# Patient Record
Sex: FEMALE | URBAN - METROPOLITAN AREA
[De-identification: names, ages, dates, MRNs, and addresses within clinical notes are randomized per-mention and may not be internally consistent; named-entity substitution may affect disease eponyms.]

---

## 2022-12-18 ENCOUNTER — INPATIENT (INPATIENT)
Facility: HOSPITAL | Age: 47
LOS: 8 days | Discharge: HOME | DRG: 885 | End: 2022-12-27
Attending: PSYCHIATRY & NEUROLOGY | Admitting: PSYCHIATRY & NEUROLOGY
Payer: COMMERCIAL

## 2022-12-18 VITALS
TEMPERATURE: 98 F | WEIGHT: 130.07 LBS | DIASTOLIC BLOOD PRESSURE: 98 MMHG | OXYGEN SATURATION: 99 % | SYSTOLIC BLOOD PRESSURE: 144 MMHG | HEIGHT: 65 IN | HEART RATE: 96 BPM | RESPIRATION RATE: 16 BRPM

## 2022-12-18 DIAGNOSIS — F10.10 ALCOHOL ABUSE, UNCOMPLICATED: ICD-10-CM

## 2022-12-18 DIAGNOSIS — F43.10 POST-TRAUMATIC STRESS DISORDER, UNSPECIFIED: ICD-10-CM

## 2022-12-18 DIAGNOSIS — F31.9 BIPOLAR DISORDER, UNSPECIFIED: ICD-10-CM

## 2022-12-18 LAB
ANION GAP SERPL CALC-SCNC: 8 MMOL/L — SIGNIFICANT CHANGE UP (ref 5–17)
APAP SERPL-MCNC: < 2 UG/ML (ref 10–30)
APPEARANCE UR: CLEAR — SIGNIFICANT CHANGE UP
BASOPHILS # BLD AUTO: 0.06 K/UL — SIGNIFICANT CHANGE UP (ref 0–0.2)
BASOPHILS NFR BLD AUTO: 0.7 % — SIGNIFICANT CHANGE UP (ref 0–2)
BILIRUB UR-MCNC: NEGATIVE — SIGNIFICANT CHANGE UP
BUN SERPL-MCNC: 12 MG/DL — SIGNIFICANT CHANGE UP (ref 7–23)
CALCIUM SERPL-MCNC: 8.1 MG/DL — LOW (ref 8.5–10.1)
CHLORIDE SERPL-SCNC: 111 MMOL/L — HIGH (ref 96–108)
CO2 SERPL-SCNC: 23 MMOL/L — SIGNIFICANT CHANGE UP (ref 22–31)
COLOR SPEC: YELLOW — SIGNIFICANT CHANGE UP
CREAT SERPL-MCNC: 0.61 MG/DL — SIGNIFICANT CHANGE UP (ref 0.5–1.3)
DIFF PNL FLD: NEGATIVE — SIGNIFICANT CHANGE UP
EGFR: 111 ML/MIN/1.73M2 — SIGNIFICANT CHANGE UP
EOSINOPHIL # BLD AUTO: 0.01 K/UL — SIGNIFICANT CHANGE UP (ref 0–0.5)
EOSINOPHIL NFR BLD AUTO: 0.1 % — SIGNIFICANT CHANGE UP (ref 0–6)
ETHANOL SERPL-MCNC: 139 MG/DL — HIGH (ref 0–10)
FLUAV AG NPH QL: SIGNIFICANT CHANGE UP
FLUBV AG NPH QL: SIGNIFICANT CHANGE UP
GLUCOSE SERPL-MCNC: 101 MG/DL — HIGH (ref 70–99)
GLUCOSE UR QL: NEGATIVE — SIGNIFICANT CHANGE UP
HCT VFR BLD CALC: 42.6 % — SIGNIFICANT CHANGE UP (ref 34.5–45)
HGB BLD-MCNC: 14.7 G/DL — SIGNIFICANT CHANGE UP (ref 11.5–15.5)
IMM GRANULOCYTES NFR BLD AUTO: 0.2 % — SIGNIFICANT CHANGE UP (ref 0–0.9)
KETONES UR-MCNC: NEGATIVE — SIGNIFICANT CHANGE UP
LEUKOCYTE ESTERASE UR-ACNC: NEGATIVE — SIGNIFICANT CHANGE UP
LYMPHOCYTES # BLD AUTO: 1.96 K/UL — SIGNIFICANT CHANGE UP (ref 1–3.3)
LYMPHOCYTES # BLD AUTO: 21.7 % — SIGNIFICANT CHANGE UP (ref 13–44)
MCHC RBC-ENTMCNC: 32 PG — SIGNIFICANT CHANGE UP (ref 27–34)
MCHC RBC-ENTMCNC: 34.5 GM/DL — SIGNIFICANT CHANGE UP (ref 32–36)
MCV RBC AUTO: 92.6 FL — SIGNIFICANT CHANGE UP (ref 80–100)
MONOCYTES # BLD AUTO: 0.45 K/UL — SIGNIFICANT CHANGE UP (ref 0–0.9)
MONOCYTES NFR BLD AUTO: 5 % — SIGNIFICANT CHANGE UP (ref 2–14)
NEUTROPHILS # BLD AUTO: 6.54 K/UL — SIGNIFICANT CHANGE UP (ref 1.8–7.4)
NEUTROPHILS NFR BLD AUTO: 72.3 % — SIGNIFICANT CHANGE UP (ref 43–77)
NITRITE UR-MCNC: NEGATIVE — SIGNIFICANT CHANGE UP
PCP SPEC-MCNC: SIGNIFICANT CHANGE UP
PH UR: 5 — SIGNIFICANT CHANGE UP (ref 5–8)
PLATELET # BLD AUTO: 296 K/UL — SIGNIFICANT CHANGE UP (ref 150–400)
POTASSIUM SERPL-MCNC: 3.8 MMOL/L — SIGNIFICANT CHANGE UP (ref 3.5–5.3)
POTASSIUM SERPL-SCNC: 3.8 MMOL/L — SIGNIFICANT CHANGE UP (ref 3.5–5.3)
PROT UR-MCNC: NEGATIVE — SIGNIFICANT CHANGE UP
RBC # BLD: 4.6 M/UL — SIGNIFICANT CHANGE UP (ref 3.8–5.2)
RBC # FLD: 12.3 % — SIGNIFICANT CHANGE UP (ref 10.3–14.5)
RSV RNA NPH QL NAA+NON-PROBE: SIGNIFICANT CHANGE UP
SALICYLATES SERPL-MCNC: 2.4 MG/DL — LOW (ref 2.8–20)
SARS-COV-2 RNA SPEC QL NAA+PROBE: SIGNIFICANT CHANGE UP
SODIUM SERPL-SCNC: 142 MMOL/L — SIGNIFICANT CHANGE UP (ref 135–145)
SP GR SPEC: 1.01 — SIGNIFICANT CHANGE UP (ref 1.01–1.02)
UROBILINOGEN FLD QL: NEGATIVE — SIGNIFICANT CHANGE UP
WBC # BLD: 9.04 K/UL — SIGNIFICANT CHANGE UP (ref 3.8–10.5)
WBC # FLD AUTO: 9.04 K/UL — SIGNIFICANT CHANGE UP (ref 3.8–10.5)

## 2022-12-18 PROCEDURE — 70498 CT ANGIOGRAPHY NECK: CPT | Mod: 26,MA

## 2022-12-18 PROCEDURE — 70496 CT ANGIOGRAPHY HEAD: CPT | Mod: 26,MA

## 2022-12-18 PROCEDURE — 99285 EMERGENCY DEPT VISIT HI MDM: CPT

## 2022-12-18 RX ORDER — DIPHENHYDRAMINE HCL 50 MG
50 CAPSULE ORAL ONCE
Refills: 0 | Status: DISCONTINUED | OUTPATIENT
Start: 2022-12-18 | End: 2022-12-18

## 2022-12-18 RX ORDER — ALPRAZOLAM 0.25 MG
1 TABLET ORAL DAILY
Refills: 0 | Status: DISCONTINUED | OUTPATIENT
Start: 2022-12-18 | End: 2022-12-20

## 2022-12-18 RX ORDER — ACETAMINOPHEN 500 MG
650 TABLET ORAL ONCE
Refills: 0 | Status: COMPLETED | OUTPATIENT
Start: 2022-12-18 | End: 2022-12-18

## 2022-12-18 RX ORDER — PRAZOSIN HCL 2 MG
1 CAPSULE ORAL AT BEDTIME
Refills: 0 | Status: DISCONTINUED | OUTPATIENT
Start: 2022-12-18 | End: 2022-12-27

## 2022-12-18 RX ORDER — DIPHENHYDRAMINE HCL 50 MG
50 CAPSULE ORAL ONCE
Refills: 0 | Status: DISCONTINUED | OUTPATIENT
Start: 2022-12-18 | End: 2022-12-27

## 2022-12-18 RX ORDER — DIPHENHYDRAMINE HCL 50 MG
50 CAPSULE ORAL EVERY 6 HOURS
Refills: 0 | Status: DISCONTINUED | OUTPATIENT
Start: 2022-12-18 | End: 2022-12-27

## 2022-12-18 RX ORDER — HALOPERIDOL DECANOATE 100 MG/ML
5 INJECTION INTRAMUSCULAR ONCE
Refills: 0 | Status: DISCONTINUED | OUTPATIENT
Start: 2022-12-18 | End: 2022-12-27

## 2022-12-18 RX ORDER — CITALOPRAM 10 MG/1
40 TABLET, FILM COATED ORAL AT BEDTIME
Refills: 0 | Status: DISCONTINUED | OUTPATIENT
Start: 2022-12-18 | End: 2022-12-20

## 2022-12-18 RX ORDER — HALOPERIDOL DECANOATE 100 MG/ML
5 INJECTION INTRAMUSCULAR EVERY 6 HOURS
Refills: 0 | Status: DISCONTINUED | OUTPATIENT
Start: 2022-12-18 | End: 2022-12-27

## 2022-12-18 RX ADMIN — Medication 650 MILLIGRAM(S): at 07:35

## 2022-12-18 RX ADMIN — CITALOPRAM 40 MILLIGRAM(S): 10 TABLET, FILM COATED ORAL at 22:51

## 2022-12-18 RX ADMIN — Medication 50 MILLIGRAM(S): at 13:22

## 2022-12-18 RX ADMIN — Medication 1 MILLIGRAM(S): at 22:51

## 2022-12-18 RX ADMIN — Medication 650 MILLIGRAM(S): at 08:00
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
## 2022-12-18 NOTE — ED ADULT NURSE NOTE - CHIEF COMPLAINT QUOTE
Pt presents to ED, [facility] [place] [facility]- badge #[id number]. As per [place] PD patient made suicidal statements, states "The son told me she told him she tried to hang herself earlier tonight." Pt currently denies SI/ HI. Endorses having 1 drink of alcohol today. Also c/o headache x weeks. Pmhx PTSD Patient calm and cooperative, placed on 1:1 upon arrival.
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
## 2022-12-18 NOTE — ED ADULT TRIAGE NOTE - CHIEF COMPLAINT QUOTE
Pt presents to ED, YAMIL [place] PD- badge #[id number]. As per [place] PD patient made suicidal statements, states "The son told me she told him she tried to hang herself earlier tonight." Pt currently denies SI/ HI. Endorses having 1 drink of alcohol today. Also c/o headache x weeks. Pmhx PTSD Pt presents to ED, YAMIL [place] PD- badge #[id number]. As per [place] PD patient made suicidal statements, states "The son told me she told him she tried to hang herself earlier tonight." Pt currently denies SI/ HI. Endorses having 1 drink of alcohol today. Also c/o headache x weeks. Pmhx PTSD Patient calm and cooperative, placed on 1:1 upon arrival.

## 2022-12-18 NOTE — ED BEHAVIORAL HEALTH ASSESSMENT NOTE - DESCRIPTION
None Vital Signs Last 24 Hrs  T(C): 36.7 (18 Dec 2022 09:11), Max: 36.7 (18 Dec 2022 03:21)  T(F): 98 (18 Dec 2022 09:11), Max: 98 (18 Dec 2022 03:21)  HR: 95 (18 Dec 2022 09:11) (95 - 96)  BP: 130/77 (18 Dec 2022 09:11) (130/77 - 144/98)  BP(mean): --  RR: 16 (18 Dec 2022 09:11) (16 - 16)  SpO2: 100% (18 Dec 2022 09:11) (99% - 100%)    Parameters below as of 18 Dec 2022 09:11  Patient On (Oxygen Delivery Method): room air See HPI

## 2022-12-18 NOTE — ED BEHAVIORAL HEALTH ASSESSMENT NOTE - NSBHATTESTAPPBILLTIME_PSY_A_CORE
I attest my time as JUANIS is greater than 50% of the total combined time spent on qualifying patient care activities. I have reviewed and verified the documentation.
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
## 2022-12-19 DIAGNOSIS — F31.9 BIPOLAR DISORDER, UNSPECIFIED: ICD-10-CM

## 2022-12-19 DIAGNOSIS — Z90.710 ACQUIRED ABSENCE OF BOTH CERVIX AND UTERUS: Chronic | ICD-10-CM

## 2022-12-19 PROCEDURE — 83036 HEMOGLOBIN GLYCOSYLATED A1C: CPT

## 2022-12-19 PROCEDURE — 99252 IP/OBS CONSLTJ NEW/EST SF 35: CPT

## 2022-12-19 PROCEDURE — 84443 ASSAY THYROID STIM HORMONE: CPT

## 2022-12-19 PROCEDURE — 84436 ASSAY OF TOTAL THYROXINE: CPT

## 2022-12-19 PROCEDURE — 80061 LIPID PANEL: CPT

## 2022-12-19 PROCEDURE — 36415 COLL VENOUS BLD VENIPUNCTURE: CPT

## 2022-12-19 PROCEDURE — 86376 MICROSOMAL ANTIBODY EACH: CPT

## 2022-12-19 RX ORDER — ACETAMINOPHEN 500 MG
650 TABLET ORAL ONCE
Refills: 0 | Status: COMPLETED | OUTPATIENT
Start: 2022-12-19 | End: 2022-12-19

## 2022-12-19 RX ADMIN — Medication 1 MILLIGRAM(S): at 08:18

## 2022-12-19 RX ADMIN — CITALOPRAM 40 MILLIGRAM(S): 10 TABLET, FILM COATED ORAL at 22:17

## 2022-12-19 RX ADMIN — Medication 2 MILLIGRAM(S): at 22:18

## 2022-12-19 RX ADMIN — Medication 650 MILLIGRAM(S): at 02:30

## 2022-12-19 RX ADMIN — Medication 1 MILLIGRAM(S): at 22:17

## 2022-12-19 RX ADMIN — Medication 650 MILLIGRAM(S): at 02:00

## 2022-12-19 NOTE — H&P ADULT - NS ATTEND AMEND GEN_ALL_CORE FT
Case discussed and reviewed in detail after initial evaluation above by ANABEL Hook. Please note my plan below.    47 y/o F w/ PMH of etoh abuse PTSD, bipolar disrder, p/w suicidal ideation    *PTSD / bipolar / suicidal ideation  -Management as per psych     *Heterogeneous 3.1 cm nodule in the left lobe of thyroid gland incidentally noted on CT  -F/u outpatient Endo + outpatient thyroid U/S  -TSH     *DVT ppx  -Encourage ambulation

## 2022-12-19 NOTE — ED ADULT NURSE REASSESSMENT NOTE - NS ED NURSE REASSESS COMMENT FT1
Assumed care of pt from previous RN. A/O x4. Respirations are even and unlabored on room air. Pt resting in bed comfortably with 1:1 aide at bedside. Environment checked and safety measures maintained. Pt offers no complaints of pain or discomfort at this time.
Pt calm and cooperative, continues to be on constant observation w/ staff at bedside at all times. Environment checked for safety. Denies pain/ symptoms at this time. Vital signs stable. Primary RN Casandra aware.
Pt resting comfortably in stretcher with eyes closed. Respirations are even and unlabored on room air. 1:1 aide at bedside. Environment checked and safety measures maintained.
pt calm and cooperative resting in bed. 1:1 maintained
pt calm and cooperative. 1:1 maintained
pt daughter Madai cell phone number: [phone]
Pt reported unable to sleep last night and asked for medication this morning for anxiety. Pt administered xanax as per PRN order. Pt noted to be sleeping after administration of xanax and comfortable appearing. MD Velasco made aware. 1:1 with clinical staff maintained at bedside.

## 2022-12-19 NOTE — ED BEHAVIORAL HEALTH NOTE - BEHAVIORAL HEALTH NOTE
Per RN patient remains in private room, gowned/wanded, and on 1:1 observation for consult. No SI/HI/AH/VH elicited; patient has remained resting/asleep. Remains in behavioral control. Patient received routine night medications; no PRN medications required. Patient unaccompanied by social supports. Patient pending psychiatric bed placement.

## 2022-12-19 NOTE — H&P ADULT - NSICDXPASTMEDICALHX_GEN_ALL_CORE_FT
PAST MEDICAL HISTORY:  Bipolar disorder      PAST MEDICAL HISTORY:  Bipolar disorder     Trevino syndrome

## 2022-12-19 NOTE — H&P ADULT - ASSESSMENT
47 y/o female with history of alcohol abuse, PTSD and Bipolar disorder admitted to inpatient psychiatry for suicidal ideation.     # Bipolar disorder   # PTSD  - Management per primary psych team    # ETOH abuse  No signs of withdrawal, CIWA is 0  - Monitor for withdrawal   - MVI/ Thiamine/ folic acid   - CIWA protocol   - Cessation counseling     DVT ppx  Patient is ambulatory, encourage ambulation     Will sign off, reconsult PRN    D/w Dr. Madden

## 2022-12-19 NOTE — ED ADULT NURSE REASSESSMENT NOTE - GENERAL PATIENT STATE
comfortable appearance/resting/sleeping
comfortable appearance/resting/sleeping
comfortable appearance/cooperative/improvement verbalized/resting/sleeping/smiling/interactive

## 2022-12-19 NOTE — H&P ADULT - NSHPPHYSICALEXAM_GEN_ALL_CORE
Vital Signs Last 24 Hrs  T(C): 36.9 (19 Dec 2022 15:54), Max: 36.9 (19 Dec 2022 07:53)  T(F): 98.5 (19 Dec 2022 15:54), Max: 98.5 (19 Dec 2022 15:08)  HR: 76 (19 Dec 2022 15:08) (69 - 83)  BP: 120/88 (19 Dec 2022 15:08) (114/84 - 127/61)    RR: 16 (19 Dec 2022 15:54) (15 - 16)  SpO2: 97% (19 Dec 2022 15:54) (96% - 100%)    Parameters below as of 19 Dec 2022 15:08  Patient On (Oxygen Delivery Method): room air

## 2022-12-20 LAB
A1C WITH ESTIMATED AVERAGE GLUCOSE RESULT: 5.5 % — SIGNIFICANT CHANGE UP (ref 4–5.6)
CHOLEST SERPL-MCNC: 196 MG/DL — SIGNIFICANT CHANGE UP
ESTIMATED AVERAGE GLUCOSE: 111 MG/DL — SIGNIFICANT CHANGE UP (ref 68–114)
HDLC SERPL-MCNC: 67 MG/DL — SIGNIFICANT CHANGE UP
LIPID PNL WITH DIRECT LDL SERPL: 96 MG/DL — SIGNIFICANT CHANGE UP
NON HDL CHOLESTEROL: 129 MG/DL — SIGNIFICANT CHANGE UP
TRIGL SERPL-MCNC: 169 MG/DL — HIGH
TSH SERPL-MCNC: 1.53 UU/ML — SIGNIFICANT CHANGE UP (ref 0.34–4.82)

## 2022-12-20 PROCEDURE — 99223 1ST HOSP IP/OBS HIGH 75: CPT

## 2022-12-20 RX ORDER — CITALOPRAM 10 MG/1
10 TABLET, FILM COATED ORAL DAILY
Refills: 0 | Status: DISCONTINUED | OUTPATIENT
Start: 2022-12-20 | End: 2022-12-21

## 2022-12-20 RX ORDER — RISPERIDONE 4 MG/1
1 TABLET ORAL EVERY 12 HOURS
Refills: 0 | Status: DISCONTINUED | OUTPATIENT
Start: 2022-12-20 | End: 2022-12-21

## 2022-12-20 RX ORDER — FOLIC ACID 0.8 MG
1 TABLET ORAL DAILY
Refills: 0 | Status: DISCONTINUED | OUTPATIENT
Start: 2022-12-20 | End: 2022-12-27

## 2022-12-20 RX ORDER — THIAMINE MONONITRATE (VIT B1) 100 MG
100 TABLET ORAL DAILY
Refills: 0 | Status: DISCONTINUED | OUTPATIENT
Start: 2022-12-20 | End: 2022-12-27

## 2022-12-20 RX ADMIN — Medication 1 MILLIGRAM(S): at 09:30

## 2022-12-20 RX ADMIN — Medication 1 MILLIGRAM(S): at 21:01

## 2022-12-20 RX ADMIN — CITALOPRAM 10 MILLIGRAM(S): 10 TABLET, FILM COATED ORAL at 21:01

## 2022-12-20 RX ADMIN — Medication 1 TABLET(S): at 09:30

## 2022-12-20 RX ADMIN — RISPERIDONE 1 MILLIGRAM(S): 4 TABLET ORAL at 21:01

## 2022-12-20 RX ADMIN — Medication 2 MILLIGRAM(S): at 21:16

## 2022-12-20 RX ADMIN — Medication 100 MILLIGRAM(S): at 09:30

## 2022-12-20 NOTE — BH INPATIENT PSYCHIATRY ASSESSMENT NOTE - NSBHCRANIAL_PSY_ALL_CORE
Recognizes 2 fingers or can read (II)/Smiles, shows teeth, opens mouth, sticks out tongue (V, VII, XI)/Normal speech (IX, X, XII)/Shoulder shrug (XI)

## 2022-12-20 NOTE — BH INPATIENT PSYCHIATRY ASSESSMENT NOTE - NSBHMETABOLIC_PSY_ALL_CORE_FT
BMI: BMI (kg/m2): 21.6 (12-18-22 @ 03:21)  HbA1c: A1C with Estimated Average Glucose Result: 5.5 % (12-20-22 @ 07:14)    Glucose:   BP: 120/88 (12-19-22 @ 15:08) (114/84 - 144/98)  Lipid Panel: Date/Time: 12-20-22 @ 07:14  Cholesterol, Serum: 196  Direct LDL: --  HDL Cholesterol, Serum: 67  Total Cholesterol/HDL Ration Measurement: --  Triglycerides, Serum: 169

## 2022-12-20 NOTE — PSYCHIATRIC REHAB INITIAL EVALUATION - PRIMARY SOURCE OF SUPPORT/COMFORT
DTC Cardiac Surgery Progress Note     Recommendations/ Comments:   Pt arrived to CVICU at 2028 on 2-9-17. Consider continuing insulin gtt for at least 48hrs post-op and eating 50% solid foods then,  1) transition off gtt per Glucostabilizer Protocol   2) continue accu-checks and humalog correctional insulin ac & hs until pt has 3 consecutive BG <150mg/dl off gtt then discontinue. Insulin gtt should not be stopped until after 2028 on 2-11-17 to complete 48hr post-op time frame. Pt has required 8.8 units over the last 6 hours. Chart reviewed on Nithya Odell. Patient is 61 y.o. male s/p Cardiac Surgery. POD 1. Pt with no history of diabetes. A1c:   Lab Results   Component Value Date/Time    Hemoglobin A1c 5.2 02/09/2017 08:34 PM         Recent Glucose Results:   Lab Results   Component Value Date/Time     (H) 02/10/2017 03:48 AM     (H) 02/09/2017 08:34 PM     (H) 02/09/2017 12:49 PM    GLUCPOC 115 (H) 02/10/2017 09:05 AM    GLUCPOC 120 (H) 02/10/2017 08:01 AM    GLUCPOC 119 (H) 02/10/2017 06:55 AM        Lab Results   Component Value Date/Time    Creatinine 1.45 02/10/2017 03:48 AM       Active Orders   Diet    DIET NPO        PO intake: No data found. Currently on insulin gtt. Current drip rate is 1.7 units per hour and blood sugar at 0906 was 115 mg/dl. Will continue to follow as needed. Thank you.   Ivanna Martin RD, CDE
child(grace)
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
## 2022-12-20 NOTE — BH INPATIENT PSYCHIATRY ASSESSMENT NOTE - NSBHCHARTREVIEWVS_PSY_A_CORE FT
Vital Signs Last 24 Hrs  T(C): 36.7 (12-20-22 @ 07:24), Max: 36.9 (12-19-22 @ 15:54)  T(F): 98 (12-20-22 @ 07:24), Max: 98.5 (12-19-22 @ 15:54)  HR: --  BP: --  BP(mean): --  RR: 16 (12-20-22 @ 07:24) (16 - 16)  SpO2: 100% (12-20-22 @ 07:24) (97% - 100%)    Orthostatic VS  12-20-22 @ 07:24  Lying BP: --/-- HR: --  Sitting BP: 117/81 HR: 87  Standing BP: 114/76 HR: 100  Site: upper right arm  Mode: electronic  Orthostatic VS  12-19-22 @ 15:54  Lying BP: --/-- HR: --  Sitting BP: 130/77 HR: 87  Standing BP: 113/68 HR: 90  Site: --  Mode: --   Vital Signs Last 24 Hrs  T(C): 36.7 (12-20-22 @ 07:24), Max: 36.7 (12-20-22 @ 07:24)  T(F): 98 (12-20-22 @ 07:24), Max: 98 (12-20-22 @ 07:24)  HR: --  BP: --  BP(mean): --  RR: 16 (12-20-22 @ 07:24) (16 - 16)  SpO2: 100% (12-20-22 @ 07:24) (100% - 100%)    Orthostatic VS  12-20-22 @ 07:24  Lying BP: --/-- HR: --  Sitting BP: 117/81 HR: 87  Standing BP: 114/76 HR: 100  Site: upper right arm  Mode: electronic  Orthostatic VS  12-19-22 @ 15:54  Lying BP: --/-- HR: --  Sitting BP: 130/77 HR: 87  Standing BP: 113/68 HR: 90  Site: --  Mode: --

## 2022-12-20 NOTE — BH INPATIENT PSYCHIATRY ASSESSMENT NOTE - NSTXMANICGOAL_PSY_ALL_CORE
Be able to identify the early signs of solis (e.g. sleep and mood changes) and to employ coping strategies to minimize acting out

## 2022-12-20 NOTE — BH INPATIENT PSYCHIATRY ASSESSMENT NOTE - CURRENT MEDICATION
MEDICATIONS  (STANDING):  citalopram 10 milliGRAM(s) Oral daily  folic acid 1 milliGRAM(s) Oral daily  multivitamin 1 Tablet(s) Oral daily  prazosin. 1 milliGRAM(s) Oral at bedtime  risperiDONE   Tablet 1 milliGRAM(s) Oral every 12 hours  thiamine 100 milliGRAM(s) Oral daily    MEDICATIONS  (PRN):  diphenhydrAMINE 50 milliGRAM(s) Oral every 6 hours PRN Extrapyramidal prophylaxis  diphenhydrAMINE Injectable 50 milliGRAM(s) IntraMuscular once PRN Extrapyramidal prophylaxis  haloperidol     Tablet 5 milliGRAM(s) Oral every 6 hours PRN moderate psychotic agitation  haloperidol    Injectable 5 milliGRAM(s) IntraMuscular Once PRN severe psychotic agitation  LORazepam     Tablet 2 milliGRAM(s) Oral every 6 hours PRN moderate psychotic agitation  LORazepam   Injectable 2 milliGRAM(s) IntraMuscular Once PRN severe psychotic agitation
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
## 2022-12-21 LAB
T4 AB SER-ACNC: 5.7 UG/DL — SIGNIFICANT CHANGE UP (ref 4.6–12)
THYROPEROXIDASE AB SERPL-ACNC: <10 IU/ML — SIGNIFICANT CHANGE UP

## 2022-12-21 PROCEDURE — 99232 SBSQ HOSP IP/OBS MODERATE 35: CPT

## 2022-12-21 RX ORDER — ARIPIPRAZOLE 15 MG/1
5 TABLET ORAL AT BEDTIME
Refills: 0 | Status: COMPLETED | OUTPATIENT
Start: 2022-12-21 | End: 2022-12-21

## 2022-12-21 RX ORDER — SENNA PLUS 8.6 MG/1
2 TABLET ORAL AT BEDTIME
Refills: 0 | Status: DISCONTINUED | OUTPATIENT
Start: 2022-12-21 | End: 2022-12-27

## 2022-12-21 RX ORDER — CITALOPRAM 10 MG/1
10 TABLET, FILM COATED ORAL AT BEDTIME
Refills: 0 | Status: DISCONTINUED | OUTPATIENT
Start: 2022-12-21 | End: 2022-12-27

## 2022-12-21 RX ADMIN — CITALOPRAM 10 MILLIGRAM(S): 10 TABLET, FILM COATED ORAL at 20:49

## 2022-12-21 RX ADMIN — ARIPIPRAZOLE 5 MILLIGRAM(S): 15 TABLET ORAL at 20:49

## 2022-12-21 RX ADMIN — Medication 1 MILLIGRAM(S): at 09:47

## 2022-12-21 RX ADMIN — Medication 1 MILLIGRAM(S): at 20:49

## 2022-12-21 RX ADMIN — Medication 1 TABLET(S): at 09:47

## 2022-12-21 RX ADMIN — Medication 100 MILLIGRAM(S): at 09:47
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
## 2022-12-21 NOTE — BH SOCIAL WORK INITIAL PSYCHOSOCIAL EVALUATION - NSBHSAALC_PSY_A_CORE FT
Per chart, pt. endorses social use vs. family reports current and past ETOH dependence with treatment 1yo and current relapse.

## 2022-12-21 NOTE — BH INPATIENT PSYCHIATRY PROGRESS NOTE - NSBHMETABOLIC_PSY_ALL_CORE_FT
BMI: BMI (kg/m2): 21.6 (12-18-22 @ 03:21)  HbA1c: A1C with Estimated Average Glucose Result: 5.7 % (12-20-22 @ 07:14)  TSH-1.53    Glucose:   BP: 120/88 (12-19-22 @ 15:08) (114/84 - 127/61)  Lipid Panel: Date/Time: 12-20-22 @ 07:14  Cholesterol, Serum: 196  Direct LDL: 96  HDL Cholesterol, Serum: 67  Triglycerides, Serum: 169
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
## 2022-12-21 NOTE — BH SOCIAL WORK INITIAL PSYCHOSOCIAL EVALUATION - NSPROGENSOURCEINFO_PSY_ALL_CORE
Other(s) Attempted to meet w/ pt. but unavailable for interview. Assessment deferred to chart review and tx. team at this time./Other(s)
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
## 2022-12-21 NOTE — BH SOCIAL WORK INITIAL PSYCHOSOCIAL EVALUATION - NSBHLIFEGOAL_PSY_ALL_CORE
Assessment deferred with pt. at this time. Per chart, pt. looking for divorce from  and to establish own work and living arrangement.

## 2022-12-21 NOTE — BH SOCIAL WORK INITIAL PSYCHOSOCIAL EVALUATION - NSBHSUPPORTOTHER_PSY_ALL_CORE
No Pt. dtr. Madai Starks [phone] and rafal  [place] [phone] provided collateral for  matthew./No
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
## 2022-12-21 NOTE — BH SOCIAL WORK INITIAL PSYCHOSOCIAL EVALUATION - NSBHLEGALOTHER_PSY_ALL_CORE
Per chart, pt. reports in the process of getting divorce; however, pt.  and dtr. both report that pt. has not been getting divorce./Divorce

## 2022-12-21 NOTE — BH SOCIAL WORK INITIAL PSYCHOSOCIAL EVALUATION - NSBHSUBSTANCEHX_PSY_ALL_CORE
Per chart, pt. BAL was 139 on arrival. U-Tox+ for benzodiazepine's (prescribed) and THC. Pt. collateral describe current and past ETOH dependence/treatment.    Per chart, pt. describes ETOH use as social use only./Yes...

## 2022-12-21 NOTE — BH SOCIAL WORK INITIAL PSYCHOSOCIAL EVALUATION - NSBHFINANCE_PSY_ALL_CORE
Assessment deferred at this time. Per chart, pt.  to  who is employed and provides financial support./Unable to answer (specify)

## 2022-12-21 NOTE — BH SOCIAL WORK INITIAL PSYCHOSOCIAL EVALUATION - NSBHCHILDRESP_PSY_ALL_CORE
Pt.  to  lilian/ [place] [phone] but in the process of , 4 adult children 29, 24, 22 and 19    pt daughter Madai cell phone number: [phone]/No Pt.  to  lilian/ [place] [phone] but in the process of  (family refute), 4 adult children 29, 24, 22 and 19    pt daughter Madai [place] phone number: [phone]/No

## 2022-12-22 PROCEDURE — 99232 SBSQ HOSP IP/OBS MODERATE 35: CPT

## 2022-12-22 RX ORDER — ARIPIPRAZOLE 15 MG/1
10 TABLET ORAL ONCE
Refills: 0 | Status: COMPLETED | OUTPATIENT
Start: 2022-12-22 | End: 2022-12-22

## 2022-12-22 RX ORDER — ACETAMINOPHEN 500 MG
650 TABLET ORAL EVERY 6 HOURS
Refills: 0 | Status: DISCONTINUED | OUTPATIENT
Start: 2022-12-22 | End: 2022-12-27

## 2022-12-22 RX ORDER — ARIPIPRAZOLE 15 MG/1
15 TABLET ORAL AT BEDTIME
Refills: 0 | Status: DISCONTINUED | OUTPATIENT
Start: 2022-12-23 | End: 2022-12-27

## 2022-12-22 RX ADMIN — ARIPIPRAZOLE 10 MILLIGRAM(S): 15 TABLET ORAL at 20:16

## 2022-12-22 RX ADMIN — CITALOPRAM 10 MILLIGRAM(S): 10 TABLET, FILM COATED ORAL at 20:16

## 2022-12-22 RX ADMIN — Medication 1 MILLIGRAM(S): at 09:05

## 2022-12-22 RX ADMIN — Medication 1 MILLIGRAM(S): at 20:17

## 2022-12-22 RX ADMIN — Medication 650 MILLIGRAM(S): at 20:59

## 2022-12-22 RX ADMIN — Medication 100 MILLIGRAM(S): at 09:05

## 2022-12-22 RX ADMIN — Medication 1 TABLET(S): at 09:06

## 2022-12-22 RX ADMIN — Medication 650 MILLIGRAM(S): at 21:29
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
## 2022-12-22 NOTE — BH INPATIENT PSYCHIATRY PROGRESS NOTE - NSBHMETABOLIC_PSY_ALL_CORE_FT
BMI: BMI (kg/m2): 21.6 (12-18-22 @ 03:21)  HbA1c: A1C with Estimated Average Glucose Result: 5.5 % (12-20-22 @ 07:14)  TSH-5.5    Glucose:   BP: 120/88 (12-19-22 @ 15:08) (120/88 - 120/88)  Lipid Panel: Date/Time: 12-20-22 @ 07:14  Cholesterol, Serum: 196  Direct LDL: 94  HDL Cholesterol, Serum: 67  Triglycerides, Serum: 169

## 2022-12-22 NOTE — BH TREATMENT PLAN - NSTXMANICINTERRN_PSY_ALL_CORE
Patient will be able to identify the early signs of solis (e.g sleep and mood changes ) and to employ coping strategies to minimize acting out.

## 2022-12-22 NOTE — BH TREATMENT PLAN - NSTXPLANTHERAPYSESSIONSFT_PSY_ALL_CORE
12-20-22  Type of therapy: N/A  Type of session: N/A  Level of patient participation: Resistance to participation  Duration of participation: 0  Therapy conducted by: Psych rehab  Therapy Summary: Patient declined to attend group programming although encouraged.

## 2022-12-22 NOTE — BH TREATMENT PLAN - NSTXMANICINTERMD_PSY_ALL_CORE
Started her Abilify 5 mg daily and to continue Celexa 10 mg HS. Prefers meds at HS  Abilify now 10 mg HS

## 2022-12-23 PROCEDURE — 99232 SBSQ HOSP IP/OBS MODERATE 35: CPT

## 2022-12-23 RX ORDER — ALPRAZOLAM 0.25 MG
1 TABLET ORAL AT BEDTIME
Refills: 0 | Status: DISCONTINUED | OUTPATIENT
Start: 2022-12-23 | End: 2022-12-27

## 2022-12-23 RX ADMIN — Medication 1 TABLET(S): at 09:12

## 2022-12-23 RX ADMIN — Medication 1 MILLIGRAM(S): at 20:41

## 2022-12-23 RX ADMIN — Medication 1 MILLIGRAM(S): at 09:12

## 2022-12-23 RX ADMIN — SENNA PLUS 2 TABLET(S): 8.6 TABLET ORAL at 20:41

## 2022-12-23 RX ADMIN — ARIPIPRAZOLE 15 MILLIGRAM(S): 15 TABLET ORAL at 20:41

## 2022-12-23 RX ADMIN — Medication 100 MILLIGRAM(S): at 09:12

## 2022-12-23 RX ADMIN — CITALOPRAM 10 MILLIGRAM(S): 10 TABLET, FILM COATED ORAL at 20:41

## 2022-12-23 NOTE — BH INPATIENT PSYCHIATRY PROGRESS NOTE - NSBHADMITMEDEDUDETAILS_PSY_A_CORE FT
Discussed meds s/e, risks, benefits of Celexa/Abilify

## 2022-12-23 NOTE — BH INPATIENT PSYCHIATRY PROGRESS NOTE - NSBHCHARTREVIEWLAB_PSY_A_CORE FT
CBC-WBC-9.04          RBC-4.60          HB-14.7          HCT-42.6    CMP-Na-142         K-3.8         Chloride-111         CO2-23         BUN-12         S.Crea-0.61
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
## 2022-12-23 NOTE — BH INPATIENT PSYCHIATRY PROGRESS NOTE - NSBHMETABOLIC_PSY_ALL_CORE_FT
BMI: BMI (kg/m2): 21.6 (12-18-22 @ 03:21)  HbA1c: A1C with Estimated Average Glucose Result: 5.5 % (12-20-22 @ 07:14)    Glucose:   BP: --  Lipid Panel: Date/Time: 12-20-22 @ 07:14  Cholesterol, Serum: 196  Direct LDL: --  HDL Cholesterol, Serum: 67  Total Cholesterol/HDL Ration Measurement: --  Triglycerides, Serum: 169

## 2022-12-23 NOTE — BH INPATIENT PSYCHIATRY PROGRESS NOTE - NSCGISEVERILLNESS_PSY_ALL_CORE
5 = Markedly ill - intrusive symptoms that distinctly impair social/occupational function or cause intrusive levels of distress
3 = Mildly ill – clearly established symptoms with minimal, if any, distress or difficulty in social and occupational function
5 = Markedly ill - intrusive symptoms that distinctly impair social/occupational function or cause intrusive levels of distress

## 2022-12-23 NOTE — BH INPATIENT PSYCHIATRY PROGRESS NOTE - NSBHCHARTREVIEWINVESTIGATE_PSY_A_CORE FT
< from: 12 Lead ECG (12.18.22 @ 05:05) >    Ventricular Rate 97 BPM    Atrial Rate 97 BPM    P-R Interval 138 ms    QRS Duration 76 ms    Q-T Interval 356 ms    QTC Calculation(Bazett) 452 ms    P Axis 69 degrees    R Axis 61 degrees    T Axis 65 degrees    Diagnosis Line Sinus rhythm with Premature atrial complexes  Cannot rule out Anterior infarct (cited on or before 18-DEC-2022)  Abnormal ECG  Confirmed by SHANNA BRADFORD MD ([id number]) on 12/18/2022 8:59:06 AM        

## 2022-12-24 PROCEDURE — 99233 SBSQ HOSP IP/OBS HIGH 50: CPT

## 2022-12-24 RX ADMIN — CITALOPRAM 10 MILLIGRAM(S): 10 TABLET, FILM COATED ORAL at 20:13

## 2022-12-24 RX ADMIN — Medication 1 MILLIGRAM(S): at 09:35

## 2022-12-24 RX ADMIN — Medication 100 MILLIGRAM(S): at 09:35

## 2022-12-24 RX ADMIN — ARIPIPRAZOLE 15 MILLIGRAM(S): 15 TABLET ORAL at 20:12

## 2022-12-24 RX ADMIN — Medication 1 MILLIGRAM(S): at 20:36

## 2022-12-24 RX ADMIN — Medication 1 TABLET(S): at 09:35

## 2022-12-24 RX ADMIN — Medication 1 MILLIGRAM(S): at 20:12

## 2022-12-24 NOTE — BH INPATIENT PSYCHIATRY PROGRESS NOTE - NSBHASSESSSUMMFT_PSY_ALL_CORE
46 year old female, , bipolar, PTSD ,EtOH use, BAL was 139 BIB after daughter called [facility] with concerns that pt was a danger to self.

## 2022-12-25 RX ADMIN — ARIPIPRAZOLE 15 MILLIGRAM(S): 15 TABLET ORAL at 20:32

## 2022-12-25 RX ADMIN — Medication 100 MILLIGRAM(S): at 08:49

## 2022-12-25 RX ADMIN — SENNA PLUS 2 TABLET(S): 8.6 TABLET ORAL at 20:31

## 2022-12-25 RX ADMIN — CITALOPRAM 10 MILLIGRAM(S): 10 TABLET, FILM COATED ORAL at 20:31

## 2022-12-25 RX ADMIN — Medication 1 MILLIGRAM(S): at 20:32

## 2022-12-25 RX ADMIN — Medication 1 MILLIGRAM(S): at 22:00

## 2022-12-25 RX ADMIN — Medication 1 MILLIGRAM(S): at 08:50

## 2022-12-25 RX ADMIN — Medication 1 TABLET(S): at 08:50

## 2022-12-25 RX ADMIN — Medication 30 MILLILITER(S): at 11:10

## 2022-12-26 RX ORDER — CITALOPRAM 10 MG/1
1 TABLET, FILM COATED ORAL
Qty: 30 | Refills: 0
Start: 2022-12-26 | End: 2023-01-24

## 2022-12-26 RX ORDER — PRAZOSIN HCL 2 MG
1 CAPSULE ORAL
Qty: 30 | Refills: 0
Start: 2022-12-26 | End: 2023-01-24

## 2022-12-26 RX ORDER — ALPRAZOLAM 0.25 MG
1 TABLET ORAL
Qty: 14 | Refills: 0
Start: 2022-12-26 | End: 2023-01-08

## 2022-12-26 RX ORDER — ARIPIPRAZOLE 15 MG/1
1 TABLET ORAL
Qty: 30 | Refills: 0
Start: 2022-12-26 | End: 2023-01-24

## 2022-12-26 RX ORDER — SENNA PLUS 8.6 MG/1
2 TABLET ORAL
Qty: 28 | Refills: 0
Start: 2022-12-26 | End: 2023-01-08

## 2022-12-26 RX ADMIN — Medication 1 MILLIGRAM(S): at 09:01

## 2022-12-26 RX ADMIN — CITALOPRAM 10 MILLIGRAM(S): 10 TABLET, FILM COATED ORAL at 21:30

## 2022-12-26 RX ADMIN — Medication 1 TABLET(S): at 09:00

## 2022-12-26 RX ADMIN — Medication 100 MILLIGRAM(S): at 09:01

## 2022-12-26 RX ADMIN — SENNA PLUS 2 TABLET(S): 8.6 TABLET ORAL at 21:30

## 2022-12-26 RX ADMIN — Medication 1 MILLIGRAM(S): at 21:30

## 2022-12-26 RX ADMIN — ARIPIPRAZOLE 15 MILLIGRAM(S): 15 TABLET ORAL at 21:30

## 2022-12-26 RX ADMIN — Medication 50 MILLIGRAM(S): at 21:30
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
## 2022-12-26 NOTE — BH INPATIENT PSYCHIATRY DISCHARGE NOTE - NSDCCPCAREPLAN_GEN_ALL_CORE_FT
PRINCIPAL DISCHARGE DIAGNOSIS  Diagnosis: Bipolar 1 disorder  Assessment and Plan of Treatment: Abilify 15 mg HS; Celexa 10 mg HS; Xanax 1 mg HS

## 2022-12-26 NOTE — BH INPATIENT PSYCHIATRY DISCHARGE NOTE - NSDCMRMEDTOKEN_GEN_ALL_CORE_FT
ALPRAZolam 1 mg oral tablet: 1 tab(s) orally once a day (at bedtime) x 14 days MDD:1 mg/day  ARIPiprazole 15 mg oral tablet: 1 tab(s) orally once a day (at bedtime) x 30 days   citalopram 10 mg oral tablet: 1 tab(s) orally once a day (at bedtime) x 30 days   prazosin 1 mg oral capsule: 1 cap(s) orally once a day (at bedtime) x 30 days   senna leaf extract oral tablet: 2 tab(s) orally once a day (at bedtime) x 14 days
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
## 2022-12-26 NOTE — BH INPATIENT PSYCHIATRY DISCHARGE NOTE - NSBHMETABOLIC_PSY_ALL_CORE_FT
BMI: BMI (kg/m2): 21.6 (12-18-22 @ 03:21)  HbA1c: A1C with Estimated Average Glucose Result: 5.5 % (12-20-22 @ 07:14)  TSH-1.53    Glucose:   BP: --  Lipid Panel: Date/Time: 12-20-22 @ 07:14  Cholesterol, Serum: 196  Direct LDL: 96  HDL Cholesterol, Serum: 67  Triglycerides, Serum: 169

## 2022-12-27 VITALS — OXYGEN SATURATION: 100 % | TEMPERATURE: 98 F | RESPIRATION RATE: 18 BRPM

## 2022-12-27 PROCEDURE — 99232 SBSQ HOSP IP/OBS MODERATE 35: CPT

## 2022-12-27 RX ADMIN — Medication 100 MILLIGRAM(S): at 09:32

## 2022-12-27 RX ADMIN — Medication 1 TABLET(S): at 09:31

## 2022-12-27 RX ADMIN — Medication 1 MILLIGRAM(S): at 09:31

## 2022-12-27 NOTE — BH INPATIENT PSYCHIATRY PROGRESS NOTE - NSBHASSESSSUMMFT_PSY_ALL_CORE
46 year old female, , bipolar, PTSD ,EtOH use, BAL was 139 BIB after daughter called [facility] with concerns that pt was a danger to self.   Covering MD Note (12/27/2022)  Pt. seen prior to her DC home from hospital on 12/27/2022. The pt was neatly attired and alert and oriented x 3. The pt was chatting away with staff and later with this writer about the pt's h/o mood swings and anxiety, both of which she described as significantly improved on her current med regimen of Abilify 15 mg po qhs, Celexa 10 mg and Xanax 1 mg all q hs per pt preference. The pt has continued to deny any current active SI /HI /AH /VH and she is looking forward to plans for herself possibly to train as a  " because I realized how much I like and miss my dogs since I have been away in the hospital."  Plan    1.Pt for DC home 12/27/2022   2.Pt has follow up appointment with DIONISIO Llamas in [facility] on 1/3/23 at 3;40 pm   3.Safety planning reviewed 46 year old female, , bipolar, PTSD ,EtOH use, BAL was 139 BIB after daughter called [facility] with concerns that pt was a danger to self.   Covering MD Note (12/27/2022)  Pt. seen prior to her DC home from hospital on 12/27/2022. The pt was neatly attired and alert and oriented x 3. The pt was chatting away with staff and later with this writer about the pt's h/o mood swings and anxiety, both of which she described as significantly improved on her current med regimen of Abilify 15 mg po qhs, Celexa 10 mg and Xanax 1 mg all q hs per pt preference. The pt has continued to deny any current active SI /HI /AH /VH and she is looking forward to plans for herself possibly to train as a  " because I realized how much I like and miss my dogs since I have been away in the hospital."  Plan    1.Pt for DC home 12/27/2022   2.Pt has follow up appointment with DIONISIO Llamas in [facility] on 1/3/23 at 3;40 pm   3.Safety planning reviewed

## 2022-12-27 NOTE — BH INPATIENT PSYCHIATRY PROGRESS NOTE - NSICDXBHPRIMARYDX_PSY_ALL_CORE
Bipolar 1 disorder   F31.9  

## 2022-12-27 NOTE — BH INPATIENT PSYCHIATRY PROGRESS NOTE - NSBHATTESTBILLINGAW_PSY_A_CORE
[id number] Inpatient care - moderate complexity - 25 minutes
[id number]-[facility] Inpatient care - moderate complexity - 25 minutes
[id number] Inpatient care - moderate complexity - 25 minutes
[id number] Inpatient care - moderate complexity - 25 minutes
Billing in another system

## 2022-12-27 NOTE — BH INPATIENT PSYCHIATRY PROGRESS NOTE - NSBHCHARTREVIEWVS_PSY_A_CORE FT
Vital Signs Last 24 Hrs  T(C): 36.6 (12-27-22 @ 06:41), Max: 36.6 (12-27-22 @ 06:41)  T(F): 97.9 (12-27-22 @ 06:41), Max: 97.9 (12-27-22 @ 06:41)  HR: --  BP: --  BP(mean): --  RR: 18 (12-27-22 @ 06:41) (18 - 18)  SpO2: 100% (12-27-22 @ 06:41) (100% - 100%)    Orthostatic VS  12-27-22 @ 06:41  Lying BP: --/-- HR: --  Sitting BP: 123/83 HR: 87  Standing BP: 124/82 HR: 90  Site: upper right arm  Mode: electronic  Orthostatic VS  12-26-22 @ 07:20  Lying BP: --/-- HR: --  Sitting BP: 115/74 HR: 79  Standing BP: 113/81 HR: 80  Site: upper right arm  Mode: electronic  
Vital Signs Last 24 Hrs  T(C): 36.7 (12-23-22 @ 08:02), Max: 36.7 (12-23-22 @ 08:02)  T(F): 98 (12-23-22 @ 08:02), Max: 98 (12-23-22 @ 08:02)  HR: --  BP: --  BP(mean): --  RR: 16 (12-23-22 @ 08:02) (16 - 16)  SpO2: 98% (12-23-22 @ 08:02) (98% - 98%)    Orthostatic VS  12-23-22 @ 08:02  Lying BP: --/-- HR: --  Sitting BP: 123/73 HR: 92  Standing BP: 112/75 HR: 88  Site: upper right arm  Mode: electronic  Orthostatic VS  12-22-22 @ 07:57  Lying BP: --/-- HR: --  Sitting BP: 116/80 HR: 88  Standing BP: 111/71 HR: 90  Site: upper right arm  Mode: electronic  
Vital Signs Last 24 Hrs  T(C): 36.6 (12-25-22 @ 07:47), Max: 36.6 (12-25-22 @ 07:47)  T(F): 97.8 (12-25-22 @ 07:47), Max: 97.8 (12-25-22 @ 07:47)  HR: --  BP: --  BP(mean): --  RR: 18 (12-25-22 @ 07:47) (18 - 18)  SpO2: 99% (12-25-22 @ 07:47) (99% - 99%)    Orthostatic VS  12-25-22 @ 07:47  Lying BP: --/-- HR: --  Sitting BP: 120/71 HR: 78  Standing BP: 105/73 HR: 97  Site: upper right arm  Mode: electronic  Orthostatic VS  12-24-22 @ 08:19  Lying BP: --/-- HR: --  Sitting BP: 118/71 HR: 91  Standing BP: 108/65 HR: 98  Site: upper right arm  Mode: electronic  
Vital Signs Last 24 Hrs  T(C): 36.4 (12-22-22 @ 07:57), Max: 36.4 (12-22-22 @ 07:57)  T(F): 97.5 (12-22-22 @ 07:57), Max: 97.5 (12-22-22 @ 07:57)  HR: --  BP: --  BP(mean): --  RR: 16 (12-22-22 @ 07:57) (16 - 16)  SpO2: 100% (12-22-22 @ 07:57) (100% - 100%)    Orthostatic VS  12-22-22 @ 07:57  Lying BP: --/-- HR: --  Sitting BP: 116/80 HR: 88  Standing BP: 111/71 HR: 90  Site: upper right arm  Mode: electronic  Orthostatic VS  12-21-22 @ 07:47  Lying BP: --/-- HR: --  Sitting BP: 116/70 HR: 87  Standing BP: 111/87 HR: 98  Site: upper right arm  Mode: electronic  
Vital Signs Last 24 Hrs  T(C): 36.9 (12-21-22 @ 07:47), Max: 36.9 (12-21-22 @ 07:47)  T(F): 98.5 (12-21-22 @ 07:47), Max: 98.5 (12-21-22 @ 07:47)  HR: --  BP: --  BP(mean): --  RR: 18 (12-21-22 @ 07:47) (18 - 18)  SpO2: 100% (12-21-22 @ 07:47) (100% - 100%)    Orthostatic VS  12-21-22 @ 07:47  Lying BP: --/-- HR: --  Sitting BP: 116/70 HR: 87  Standing BP: 111/87 HR: 98  Site: upper right arm  Mode: electronic  Orthostatic VS  12-20-22 @ 07:24  Lying BP: --/-- HR: --  Sitting BP: 117/81 HR: 87  Standing BP: 114/76 HR: 100  Site: upper right arm  Mode: electronic  Orthostatic VS  12-19-22 @ 15:54  Lying BP: --/-- HR: --  Sitting BP: 130/77 HR: 87  Standing BP: 113/68 HR: 90  Site: --  Mode: --

## 2022-12-27 NOTE — BH INPATIENT PSYCHIATRY PROGRESS NOTE - PRN MEDS
MEDICATIONS  (PRN):  diphenhydrAMINE 50 milliGRAM(s) Oral every 6 hours PRN Extrapyramidal prophylaxis  diphenhydrAMINE Injectable 50 milliGRAM(s) IntraMuscular once PRN Extrapyramidal prophylaxis  haloperidol     Tablet 5 milliGRAM(s) Oral every 6 hours PRN moderate psychotic agitation  haloperidol    Injectable 5 milliGRAM(s) IntraMuscular Once PRN severe psychotic agitation  LORazepam     Tablet 2 milliGRAM(s) Oral every 6 hours PRN moderate psychotic agitation  LORazepam   Injectable 2 milliGRAM(s) IntraMuscular Once PRN severe psychotic agitation  
MEDICATIONS  (PRN):  acetaminophen     Tablet .. 650 milliGRAM(s) Oral every 6 hours PRN Moderate Pain (4 - 6)  diphenhydrAMINE 50 milliGRAM(s) Oral every 6 hours PRN Extrapyramidal prophylaxis  diphenhydrAMINE Injectable 50 milliGRAM(s) IntraMuscular once PRN Extrapyramidal prophylaxis  haloperidol     Tablet 5 milliGRAM(s) Oral every 6 hours PRN moderate psychotic agitation  haloperidol    Injectable 5 milliGRAM(s) IntraMuscular Once PRN severe psychotic agitation  LORazepam     Tablet 2 milliGRAM(s) Oral every 6 hours PRN moderate psychotic agitation  LORazepam   Injectable 2 milliGRAM(s) IntraMuscular Once PRN severe psychotic agitation  
MEDICATIONS  (PRN):  acetaminophen     Tablet .. 650 milliGRAM(s) Oral every 6 hours PRN Moderate Pain (4 - 6)  aluminum hydroxide/magnesium hydroxide/simethicone Suspension 30 milliLiter(s) Oral every 6 hours PRN Dyspepsia  diphenhydrAMINE 50 milliGRAM(s) Oral every 6 hours PRN Extrapyramidal prophylaxis  diphenhydrAMINE Injectable 50 milliGRAM(s) IntraMuscular once PRN Extrapyramidal prophylaxis  haloperidol     Tablet 5 milliGRAM(s) Oral every 6 hours PRN moderate psychotic agitation  haloperidol    Injectable 5 milliGRAM(s) IntraMuscular Once PRN severe psychotic agitation  
MEDICATIONS  (PRN):  acetaminophen     Tablet .. 650 milliGRAM(s) Oral every 6 hours PRN Moderate Pain (4 - 6)  aluminum hydroxide/magnesium hydroxide/simethicone Suspension 30 milliLiter(s) Oral every 6 hours PRN Dyspepsia  diphenhydrAMINE 50 milliGRAM(s) Oral every 6 hours PRN Extrapyramidal prophylaxis  diphenhydrAMINE Injectable 50 milliGRAM(s) IntraMuscular once PRN Extrapyramidal prophylaxis  haloperidol     Tablet 5 milliGRAM(s) Oral every 6 hours PRN moderate psychotic agitation  haloperidol    Injectable 5 milliGRAM(s) IntraMuscular Once PRN severe psychotic agitation  LORazepam     Tablet 2 milliGRAM(s) Oral every 6 hours PRN moderate psychotic agitation  LORazepam   Injectable 2 milliGRAM(s) IntraMuscular Once PRN severe psychotic agitation  
MEDICATIONS  (PRN):  diphenhydrAMINE 50 milliGRAM(s) Oral every 6 hours PRN Extrapyramidal prophylaxis  diphenhydrAMINE Injectable 50 milliGRAM(s) IntraMuscular once PRN Extrapyramidal prophylaxis  haloperidol     Tablet 5 milliGRAM(s) Oral every 6 hours PRN moderate psychotic agitation  haloperidol    Injectable 5 milliGRAM(s) IntraMuscular Once PRN severe psychotic agitation  LORazepam     Tablet 2 milliGRAM(s) Oral every 6 hours PRN moderate psychotic agitation  LORazepam   Injectable 2 milliGRAM(s) IntraMuscular Once PRN severe psychotic agitation

## 2022-12-27 NOTE — BH INPATIENT PSYCHIATRY PROGRESS NOTE - NSTXDCOTHRGOAL_PSY_ALL_CORE
safe placement upon d/c from 5N and appt for psych a/c within 5 business days of d/c from [facility].
Patient will be referred to the appropriate level of outpatient treatment and have appointments within 3-5 days of discharge.  Patient will be discharged to safe housing either back home or DSS emergency housing.  Benefits in place   will explore need for duel diagnosis tx with appointments if needed.

## 2022-12-27 NOTE — BH INPATIENT PSYCHIATRY PROGRESS NOTE - NSBHCONSBHPROVDETAILS_PSY_A_CORE  FT
Pt has follow up with DIONISIO Llamas in [place] on 1/3/2023 at 3:40 pm  Pt has follow up with DIONISIO Llamas in [place] on 1/3/2023 at 3:40 pm

## 2022-12-27 NOTE — BH INPATIENT PSYCHIATRY PROGRESS NOTE - NSDCCRITERIA_PSY_ALL_CORE
Pt with decreased mood and affect stability
Pt with decreased mood and affect stability
pt with increased mood and affect stability
Pt with decreased mood and affect stability
pt with increased mood and affect stability

## 2022-12-27 NOTE — BH INPATIENT PSYCHIATRY PROGRESS NOTE - NSTXMANICINTERMD_PSY_ALL_CORE
Started her Abilify 5 mg daily and to continue Celexa 10 mg HS. Prefers meds at HS  Abilify now 10 mg HS, Abilify further increased to 15 mg HS and Xanax 1 mg HS also given
Started her Abilify 5 mg daily and to continue Celexa 10 mg HS. Prefers meds at HS  Abilify now 10 mg HS, Abilify further increased to 15 mg HS and Xanax 1 mg HS also given
Started her Abilify 5 mg daily and to continue Celexa 10 mg HS. Prefers meds at HS  Abilify now 10 mg HS
Started her Abilify 5 mg daily and to continue Celexa 10 mg HS. Prefers meds at HS  Abilify now 10 mg HS, Abilify further increased to 15 mg HS and Xanax 1 mg HS also given
Started her Abilify 5 mg daily and to continue Celexa 10 mg HS. prefers meds at HS

## 2022-12-27 NOTE — BH INPATIENT PSYCHIATRY PROGRESS NOTE - NSICDXBHSECONDARYDX_PSY_ALL_CORE
ETOH abuse   F10.10  

## 2022-12-27 NOTE — BH DISCHARGE NOTE NURSING/SOCIAL WORK/PSYCH REHAB - NSCDUDCCRISIS_PSY_A_CORE
.[facility] 1 [phone]) 735-UPCI (6230) Website: www.[facility].org/.  [facility] for Children, Adults and Families  [address] [place] [address]  Mobile Crisis Hotline – (391) 921-3510/.National Suicide Prevention Lifeline 1 (008) 982-0796/.  [facility]  1 (939) LIFENET (034-9617)/.  [facility]  (164) 978-8933/.  [facility] [facility] Response Crisis Hotline  (292) 219-7241  24 hour telephone crisis intervention and suicide prevention hotline concerned with all mental health issues/.  [facility]  [address] [place] [address] [phone]) [phone]   Hours:  Monday through Friday from 9 AM to 3 PM/Other.../988 Suicide and Crisis Lifeline

## 2022-12-27 NOTE — BH DISCHARGE NOTE NURSING/SOCIAL WORK/PSYCH REHAB - PATIENT PORTAL LINK FT
You can access the FollowMyHealth Patient Portal offered by [facility] by registering at the following website: [email or web address]. By joining [facility]’s FollowMyHealth portal, you will also be able to view your health information using other applications (apps) compatible with our system.
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
## 2022-12-27 NOTE — BH INPATIENT PSYCHIATRY PROGRESS NOTE - CURRENT MEDICATION
MEDICATIONS  (STANDING):  ARIPiprazole 10 milliGRAM(s) Oral once  citalopram 10 milliGRAM(s) Oral at bedtime  folic acid 1 milliGRAM(s) Oral daily  multivitamin 1 Tablet(s) Oral daily  prazosin. 1 milliGRAM(s) Oral at bedtime  senna 2 Tablet(s) Oral at bedtime  thiamine 100 milliGRAM(s) Oral daily    MEDICATIONS  (PRN):  diphenhydrAMINE 50 milliGRAM(s) Oral every 6 hours PRN Extrapyramidal prophylaxis  diphenhydrAMINE Injectable 50 milliGRAM(s) IntraMuscular once PRN Extrapyramidal prophylaxis  haloperidol     Tablet 5 milliGRAM(s) Oral every 6 hours PRN moderate psychotic agitation  haloperidol    Injectable 5 milliGRAM(s) IntraMuscular Once PRN severe psychotic agitation  LORazepam     Tablet 2 milliGRAM(s) Oral every 6 hours PRN moderate psychotic agitation  LORazepam   Injectable 2 milliGRAM(s) IntraMuscular Once PRN severe psychotic agitation  
MEDICATIONS  (STANDING):  ARIPiprazole 5 milliGRAM(s) Oral at bedtime  citalopram 10 milliGRAM(s) Oral at bedtime  folic acid 1 milliGRAM(s) Oral daily  multivitamin 1 Tablet(s) Oral daily  prazosin. 1 milliGRAM(s) Oral at bedtime  senna 2 Tablet(s) Oral at bedtime  thiamine 100 milliGRAM(s) Oral daily    MEDICATIONS  (PRN):  diphenhydrAMINE 50 milliGRAM(s) Oral every 6 hours PRN Extrapyramidal prophylaxis  diphenhydrAMINE Injectable 50 milliGRAM(s) IntraMuscular once PRN Extrapyramidal prophylaxis  haloperidol     Tablet 5 milliGRAM(s) Oral every 6 hours PRN moderate psychotic agitation  haloperidol    Injectable 5 milliGRAM(s) IntraMuscular Once PRN severe psychotic agitation  LORazepam     Tablet 2 milliGRAM(s) Oral every 6 hours PRN moderate psychotic agitation  LORazepam   Injectable 2 milliGRAM(s) IntraMuscular Once PRN severe psychotic agitation  
MEDICATIONS  (STANDING):  ALPRAZolam 1 milliGRAM(s) Oral at bedtime  ARIPiprazole 15 milliGRAM(s) Oral at bedtime  citalopram 10 milliGRAM(s) Oral at bedtime  folic acid 1 milliGRAM(s) Oral daily  multivitamin 1 Tablet(s) Oral daily  prazosin. 1 milliGRAM(s) Oral at bedtime  senna 2 Tablet(s) Oral at bedtime  thiamine 100 milliGRAM(s) Oral daily    MEDICATIONS  (PRN):  acetaminophen     Tablet .. 650 milliGRAM(s) Oral every 6 hours PRN Moderate Pain (4 - 6)  aluminum hydroxide/magnesium hydroxide/simethicone Suspension 30 milliLiter(s) Oral every 6 hours PRN Dyspepsia  diphenhydrAMINE 50 milliGRAM(s) Oral every 6 hours PRN Extrapyramidal prophylaxis  diphenhydrAMINE Injectable 50 milliGRAM(s) IntraMuscular once PRN Extrapyramidal prophylaxis  haloperidol     Tablet 5 milliGRAM(s) Oral every 6 hours PRN moderate psychotic agitation  haloperidol    Injectable 5 milliGRAM(s) IntraMuscular Once PRN severe psychotic agitation  LORazepam     Tablet 2 milliGRAM(s) Oral every 6 hours PRN moderate psychotic agitation  LORazepam   Injectable 2 milliGRAM(s) IntraMuscular Once PRN severe psychotic agitation  
MEDICATIONS  (STANDING):  ALPRAZolam 1 milliGRAM(s) Oral at bedtime  ARIPiprazole 15 milliGRAM(s) Oral at bedtime  citalopram 10 milliGRAM(s) Oral at bedtime  folic acid 1 milliGRAM(s) Oral daily  multivitamin 1 Tablet(s) Oral daily  prazosin. 1 milliGRAM(s) Oral at bedtime  senna 2 Tablet(s) Oral at bedtime  thiamine 100 milliGRAM(s) Oral daily    MEDICATIONS  (PRN):  acetaminophen     Tablet .. 650 milliGRAM(s) Oral every 6 hours PRN Moderate Pain (4 - 6)  aluminum hydroxide/magnesium hydroxide/simethicone Suspension 30 milliLiter(s) Oral every 6 hours PRN Dyspepsia  diphenhydrAMINE 50 milliGRAM(s) Oral every 6 hours PRN Extrapyramidal prophylaxis  diphenhydrAMINE Injectable 50 milliGRAM(s) IntraMuscular once PRN Extrapyramidal prophylaxis  haloperidol     Tablet 5 milliGRAM(s) Oral every 6 hours PRN moderate psychotic agitation  haloperidol    Injectable 5 milliGRAM(s) IntraMuscular Once PRN severe psychotic agitation  
MEDICATIONS  (STANDING):  ALPRAZolam 1 milliGRAM(s) Oral at bedtime  ARIPiprazole 15 milliGRAM(s) Oral at bedtime  citalopram 10 milliGRAM(s) Oral at bedtime  folic acid 1 milliGRAM(s) Oral daily  multivitamin 1 Tablet(s) Oral daily  prazosin. 1 milliGRAM(s) Oral at bedtime  senna 2 Tablet(s) Oral at bedtime  thiamine 100 milliGRAM(s) Oral daily    MEDICATIONS  (PRN):  acetaminophen     Tablet .. 650 milliGRAM(s) Oral every 6 hours PRN Moderate Pain (4 - 6)  diphenhydrAMINE 50 milliGRAM(s) Oral every 6 hours PRN Extrapyramidal prophylaxis  diphenhydrAMINE Injectable 50 milliGRAM(s) IntraMuscular once PRN Extrapyramidal prophylaxis  haloperidol     Tablet 5 milliGRAM(s) Oral every 6 hours PRN moderate psychotic agitation  haloperidol    Injectable 5 milliGRAM(s) IntraMuscular Once PRN severe psychotic agitation  LORazepam     Tablet 2 milliGRAM(s) Oral every 6 hours PRN moderate psychotic agitation  LORazepam   Injectable 2 milliGRAM(s) IntraMuscular Once PRN severe psychotic agitation

## 2022-12-27 NOTE — BH INPATIENT PSYCHIATRY PROGRESS NOTE - NSBHMETABOLIC_PSY_ALL_CORE_FT
BMI: BMI (kg/m2): 21.6 (12-18-22 @ 03:21)  HbA1c: A1C with Estimated Average Glucose Result: 5.5 % (12-20-22 @ 07:14)    Lipid Panel: Date/Time: 12-20-22 @ 07:14  Cholesterol, Serum: 196  Direct LDL: --  HDL Cholesterol, Serum: 67  Total Cholesterol/HDL Ration Measurement: --  Triglycerides, Serum: 169   BMI: BMI (kg/m2): 21.6 (12-18-22 @ 03:21)  HbA1c: A1C with Estimated Average Glucose Result: 5.5 % (12-20-22 @ 07:14)    Glucose:   BP: --  Lipid Panel: Date/Time: 12-20-22 @ 07:14  Cholesterol, Serum: 196  Direct LDL: --  HDL Cholesterol, Serum: 67  Total Cholesterol/HDL Ration Measurement: --  Triglycerides, Serum: 169

## 2022-12-27 NOTE — BH INPATIENT PSYCHIATRY PROGRESS NOTE - NSBHMSETHTPROC_PSY_A_CORE
Circumstantial/Perseverative/Illogical
Circumstantial/Perseverative/Illogical
Illogical
Circumstantial/Perseverative/Illogical
Linear/Circumstantial

## 2022-12-27 NOTE — BH INPATIENT PSYCHIATRY PROGRESS NOTE - NSTXCOPEINTERMD_PSY_ALL_CORE
To join groups for coping skills acquisition
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
## 2022-12-27 NOTE — BH INPATIENT PSYCHIATRY PROGRESS NOTE - NSBHMSESPABN_PSY_A_CORE
Pressured rate/Increased productivity
Increased productivity
Pressured rate/Increased productivity

## 2022-12-30 NOTE — CHART NOTE - NSCHARTNOTEFT_GEN_A_CORE
Left voice message @1100.  Awaiting return call.
Left voice message.  If no return call by the end of the day a letter will be sent to address on file.
Left voice message and awaiting return call.

## 2023-01-03 DIAGNOSIS — R45.851 SUICIDAL IDEATIONS: ICD-10-CM

## 2023-01-03 DIAGNOSIS — F10.10 ALCOHOL ABUSE, UNCOMPLICATED: ICD-10-CM

## 2023-01-03 DIAGNOSIS — F31.9 BIPOLAR DISORDER, UNSPECIFIED: ICD-10-CM

## 2023-01-03 DIAGNOSIS — F43.10 POST-TRAUMATIC STRESS DISORDER, UNSPECIFIED: ICD-10-CM

## 2023-01-03 DIAGNOSIS — Z15.09 GENETIC SUSCEPTIBILITY TO OTHER MALIGNANT NEOPLASM: ICD-10-CM
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
## 2023-06-09 NOTE — ED PROVIDER NOTE - CPE EDP RESP NORM
[No] : No [Never (0 pts)] : Never (0 points) [No falls in past year] : Patient reported no falls in the past year [0] : 2) Feeling down, depressed, or hopeless: Not at all (0) [PHQ-2 Negative - No further assessment needed] : PHQ-2 Negative - No further assessment needed [Never] : Never [Audit-CScore] : 0 [de-identified] : Walks [de-identified] : Normal normal... [[id number]] : 0 [AdvancecareDate] : 06/08/2023

## 2023-06-19 NOTE — BH INPATIENT PSYCHIATRY PROGRESS NOTE - NSTXMANICDATETRGT_PSY_ALL_CORE
27-Dec-2022
[facility]  Gastroenterology & Nutrition  [address]  [place] [address]  Phone: [phone]  Fax: [phone]  Follow Up Time: 1-3 Days    
27-Dec-2022

## 2024-05-22 NOTE — BH INPATIENT PSYCHIATRY DISCHARGE NOTE - NSICDXPASTSURGICALHX_GEN_ALL_CORE_FT
[Time Spent: ___ minutes] : I have spent [unfilled] minutes of time on the encounter.
PAST SURGICAL HISTORY:  History of hysterectomy

## 2025-01-21 NOTE — BH INPATIENT PSYCHIATRY PROGRESS NOTE - NSTXDCOTHRINTERMD_PSY_ALL_CORE
Bed: 54  Expected date: 1/21/25  Expected time: 11:54 AM  Means of arrival:   Comments:  17  
Started on Abilify 5 mg with Celexa 10 mg HS. Abilify increased to 10 mg HS. Abilify further increased to 15 mg HS with Xanax 1 mg HS
Started on Abilify 5 mg with Celexa 10 mg daily
Started on Abilify 5 mg with Celexa 10 mg HS. Abilify increased to 10 mg HS

## 2025-04-12 NOTE — BH INPATIENT PSYCHIATRY ASSESSMENT NOTE - NSBHATTESTAPPBILLTIME_PSY_A_CORE
Resume diet from prior to hospitalization   I attest my time as JUANIS is greater than 50% of the total combined time spent on qualifying patient care activities. I have reviewed and verified the documentation.